# Patient Record
Sex: MALE | Race: WHITE
[De-identification: names, ages, dates, MRNs, and addresses within clinical notes are randomized per-mention and may not be internally consistent; named-entity substitution may affect disease eponyms.]

---

## 2019-10-11 ENCOUNTER — HOSPITAL ENCOUNTER (OUTPATIENT)
Dept: HOSPITAL 25 - OREAST | Age: 54
LOS: 2 days | Discharge: HOME | End: 2019-10-13
Attending: ORTHOPAEDIC SURGERY
Payer: COMMERCIAL

## 2019-10-11 VITALS — DIASTOLIC BLOOD PRESSURE: 82 MMHG | SYSTOLIC BLOOD PRESSURE: 120 MMHG

## 2019-10-11 DIAGNOSIS — M67.471: Primary | ICD-10-CM

## 2019-10-11 PROCEDURE — 88304 TISSUE EXAM BY PATHOLOGIST: CPT

## 2019-10-11 NOTE — OP
Operative Report - Blank





- Operative Report


Date of Operation: 10/11/19


Note: 


PATIENT: Malvin Katz





YOB: 1965





DATE OF SURGERY: 10/11/2019





SURGEON: Haim Campoverde MD





ASSISTANT: MAXIME Jackson, whos assistance was necessary for positioning, 

retraction, help with instrumentation, and closure.





ANESTHESIOLOGIST: Dr. Santos





PREOPERATIVE DIAGNOSIS: Right foot ganglion cyst





POSTOPERATIVE DIAGNOSIS: Right foot ganglion cyst





OPERATION: Right foot ganglion cyst excision





ANESTHESIA: MAC with local anesthesia provided by surgeon





IMPLANTS: none





TOURNIQUET TIME: Less than 30 minutes with an ankle Esmarch tourniquet





SPECIMENS: ganglion cyst to pathology





ESTIMATED BLOOD LOSS: minimal





COMPLICATIONS: none





STATUS: Stable from the operating room to the recovery room and then home.





INDICATIONS FOR PROCEDURE:


Colin has had a painful, recurrent right dorsolateral foot ganglion that has 

been aspirated a couple of times by a podiatrist but recurred. Both operative 

and non operative treatment alternatives were reviewed. Further, the nature and 

risks of surgery were reviewed in careful detail, in the office as well as the 

pre-operative holding area. Our discussions regarding the risks of surgery 

included, but were not limited to, infection, wound problems, nerve injury, 

neuroma, RSD, persistent symptoms, cyst recurrence, blood clot, recurrence, 

failure of the surgery, and even the remote chance of catastrophic complication.





DESCRIPTION OF PROCEDURE:


The patient was seen in the preoperative holding unit and informed written 

consent was obtained.  The appropriate extremity was marked. The patient was 

then brought to the operating room and carefully positioned on the operating 

room table. Anesthesia was induced. All bony prominences were padded with great 

care. A chlorhexidine based pre-scrub was performed followed by a chloraprep 

prep and drape in standard sterile fashion. A surgical safety pause was then 

conducted in which we confirmed the appropriate patient, extremity, planned 

procedure, availability of equipment, indication and administration of 

prophylactic antibiotics, and DVT prophylaxis in the form of a compression boot 

on the non-surgical extremity. 





We began with Esmarch exsanguination of the limb and placement of an ankle 

Esmarch tourniquet. An incision was made over the cyst and then careful blunt 

dissection was made to get down to the deep layer overlying the cyst. Blunt 

dissection was then utilized to define the cyst. The cyst was then amputated at 

its stalk excised. Cautery was used to ablate the cysts stalk. No bony 

prominences were palpated at the joint which would be amenable to 

saucerization. At this point, we irrigated copiously and then closed in layers 

meticulously utilizing 3-0 Monocryl and 3-0 nylon for the skin.  A sterile 

dressing was then applied.





The patient was then awakened from anesthesia and transferred to the recovery 

room in stable condition.  There were no complications.  All needle and sponge 

counts were correct at the end of the case.





ATTESTATION:


I attest I was present and scrubbed and performed the critical portions of the 

procedure myself.





POSTOPERATIVE PLAN: She will follow-up in 2 weeks for likely suture removal.